# Patient Record
Sex: MALE | Race: OTHER | HISPANIC OR LATINO
[De-identification: names, ages, dates, MRNs, and addresses within clinical notes are randomized per-mention and may not be internally consistent; named-entity substitution may affect disease eponyms.]

---

## 2019-05-09 ENCOUNTER — APPOINTMENT (OUTPATIENT)
Dept: SURGERY | Facility: CLINIC | Age: 54
End: 2019-05-09
Payer: COMMERCIAL

## 2019-05-09 VITALS — HEIGHT: 69 IN | BODY MASS INDEX: 33.33 KG/M2 | WEIGHT: 225 LBS

## 2019-05-09 VITALS — SYSTOLIC BLOOD PRESSURE: 115 MMHG | HEART RATE: 75 BPM | OXYGEN SATURATION: 96 % | DIASTOLIC BLOOD PRESSURE: 69 MMHG

## 2019-05-09 PROBLEM — Z00.00 ENCOUNTER FOR PREVENTIVE HEALTH EXAMINATION: Status: ACTIVE | Noted: 2019-05-09

## 2019-05-09 PROCEDURE — 99243 OFF/OP CNSLTJ NEW/EST LOW 30: CPT

## 2019-06-28 ENCOUNTER — OUTPATIENT (OUTPATIENT)
Dept: OUTPATIENT SERVICES | Facility: HOSPITAL | Age: 54
LOS: 1 days | End: 2019-06-28
Payer: COMMERCIAL

## 2019-06-28 VITALS
OXYGEN SATURATION: 99 % | RESPIRATION RATE: 18 BRPM | HEART RATE: 71 BPM | SYSTOLIC BLOOD PRESSURE: 139 MMHG | HEIGHT: 69 IN | TEMPERATURE: 98 F | DIASTOLIC BLOOD PRESSURE: 87 MMHG | WEIGHT: 225.09 LBS

## 2019-06-28 DIAGNOSIS — I10 ESSENTIAL (PRIMARY) HYPERTENSION: ICD-10-CM

## 2019-06-28 DIAGNOSIS — R73.03 PREDIABETES: ICD-10-CM

## 2019-06-28 DIAGNOSIS — Z98.89 OTHER SPECIFIED POSTPROCEDURAL STATES: Chronic | ICD-10-CM

## 2019-06-28 DIAGNOSIS — K40.90 UNILATERAL INGUINAL HERNIA, WITHOUT OBSTRUCTION OR GANGRENE, NOT SPECIFIED AS RECURRENT: ICD-10-CM

## 2019-06-28 DIAGNOSIS — Z01.818 ENCOUNTER FOR OTHER PREPROCEDURAL EXAMINATION: ICD-10-CM

## 2019-06-28 PROCEDURE — G0463: CPT

## 2019-06-28 RX ORDER — SODIUM CHLORIDE 9 MG/ML
3 INJECTION INTRAMUSCULAR; INTRAVENOUS; SUBCUTANEOUS EVERY 8 HOURS
Refills: 0 | Status: DISCONTINUED | OUTPATIENT
Start: 2019-07-01 | End: 2019-07-09

## 2019-06-28 NOTE — H&P PST ADULT - NSCAFFEAMTFREQ_GEN_ALL_CORE_SD
Patient called stating her doctor wants her to have a procedure done. She does not think she needs it and wants to know if it will affect her MS. She would not tell me what procedure.   Can be reached [phone] 1-2 cups/cans per day

## 2019-06-28 NOTE — H&P PST ADULT - NSICDXPASTSURGICALHX_GEN_ALL_CORE_FT
PAST SURGICAL HISTORY:  S/P wrist surgery left PAST SURGICAL HISTORY:  H/O excision of mass abdominal wall mass    History of fusion of cervical spine C5-C6 fracture    S/P wrist surgery left

## 2019-06-28 NOTE — H&P PST ADULT - NEGATIVE OPHTHALMOLOGIC SYMPTOMS
From: Colonel Benavides  To: Margarita Blanca NP  Sent: 6/28/2017 11:15 AM EDT  Subject: Prescription Question    Delta Cruz,   This is Darryl Mancuso wife. We took him to the [facility] on 6/22 for staph infection generalized all over his body. He was taking Cipro 500mg for this since 6/23 but somehow he lost his prescription. He had taken the Meds for 4 days out of the 10 with the start of some improvement in the skin. I am sending this to see if you could possibly send a RX to the pharmacy for the other 6 days. Please let us know if this would be ok or does he need to come see you for this.    Thank you  [facility]
no blurred vision R/no blurred vision L

## 2019-06-28 NOTE — H&P PST ADULT - RS GEN PE MLT RESP DETAILS PC
breath sounds equal/good air movement/no rhonchi/normal/airway patent/clear to auscultation bilaterally/no rales/no chest wall tenderness/no intercostal retractions/no subcutaneous emphysema/no wheezes/respirations non-labored

## 2019-06-28 NOTE — H&P PST ADULT - NEGATIVE GENERAL GENITOURINARY SYMPTOMS
BPH/no dysuria/no nocturia/normal urinary frequency/no incontinence/no hematuria/no urine discoloration/no bladder infections

## 2019-06-28 NOTE — H&P PST ADULT - ASSESSMENT
53 yr old male with PMH of HTN, prediabetes, BPH, Hyperuricemia presents with right inguinal hernia. Pt is scheduled for right inguinal hernia repair with mesh on 7/01/2019.

## 2019-06-28 NOTE — H&P PST ADULT - NSICDXFAMILYHX_GEN_ALL_CORE_FT
FAMILY HISTORY:  Family history of diabetes mellitus in father  Family history of diabetes mellitus in mother  Family history of hypertension in father  Family history of hypertension in mother  Family history of throat cancer, father

## 2019-06-28 NOTE — H&P PST ADULT - GASTROINTESTINAL DETAILS
nontender/soft/no distention/no rebound tenderness/bowel sounds normal/no bruit/normal/no masses palpable

## 2019-06-28 NOTE — H&P PST ADULT - NSICDXPASTMEDICALHX_GEN_ALL_CORE_FT
PAST MEDICAL HISTORY:  BPH (benign prostatic hyperplasia)     HTN (hypertension)     Kidney stone     Prediabetes

## 2019-06-28 NOTE — H&P PST ADULT - NSICDXPROBLEM_GEN_ALL_CORE_FT
PROBLEM DIAGNOSES  Problem: Prediabetes  Assessment and Plan: Lifestyle modifications encouraged. Perioperative glucose monitoring and cover as needed.    Problem: HTN (hypertension)  Assessment and Plan: Instructed to take Amlodipine and take with sips of water on day of surgery. Cleared by PCP for procedure. Follow-up with PCP for blood pressure management.    Problem: Right inguinal hernia  Assessment and Plan: Repair of right inguinal hernia with mesh on 7/01/2019. Preoperative instructions given. Verbalized understanding.

## 2019-06-28 NOTE — H&P PST ADULT - HISTORY OF PRESENT ILLNESS
53 yr old male with PMH of presents with c/o intermittent inguinal pain due to hernia. Pt reports worsening of pain with activity. Pt for right inguinal hernia repair with mesh on 7/01/2019. 53 yr old male with PMH of HTN, prediabetes, BPH, Hyperuricemia presents with c/o right intermittent inguinal pain due to hernia. Pt reports worsening of pain with strenuous activities and palpation. Pt for right inguinal hernia repair with mesh on 7/01/2019.

## 2019-06-28 NOTE — H&P PST ADULT - NEGATIVE CARDIOVASCULAR SYMPTOMS
no dyspnea on exertion/no chest pain/no orthopnea/no paroxysmal nocturnal dyspnea/no peripheral edema/no claudication/no palpitations

## 2019-06-28 NOTE — H&P PST ADULT - NSANTHOSAYNRD_GEN_A_CORE
No. ZAIRA screening performed.  STOP BANG Legend: 0-2 = LOW Risk; 3-4 = INTERMEDIATE Risk; 5-8 = HIGH Risk

## 2019-06-30 ENCOUNTER — TRANSCRIPTION ENCOUNTER (OUTPATIENT)
Age: 54
End: 2019-06-30

## 2019-07-01 ENCOUNTER — OUTPATIENT (OUTPATIENT)
Dept: OUTPATIENT SERVICES | Facility: HOSPITAL | Age: 54
LOS: 1 days | End: 2019-07-01
Payer: COMMERCIAL

## 2019-07-01 ENCOUNTER — APPOINTMENT (OUTPATIENT)
Dept: SURGERY | Facility: HOSPITAL | Age: 54
End: 2019-07-01

## 2019-07-01 VITALS
HEIGHT: 69 IN | DIASTOLIC BLOOD PRESSURE: 86 MMHG | OXYGEN SATURATION: 97 % | WEIGHT: 225.09 LBS | TEMPERATURE: 98 F | RESPIRATION RATE: 16 BRPM | HEART RATE: 65 BPM | SYSTOLIC BLOOD PRESSURE: 140 MMHG

## 2019-07-01 VITALS
TEMPERATURE: 98 F | SYSTOLIC BLOOD PRESSURE: 110 MMHG | HEART RATE: 66 BPM | DIASTOLIC BLOOD PRESSURE: 76 MMHG | OXYGEN SATURATION: 100 % | RESPIRATION RATE: 17 BRPM

## 2019-07-01 DIAGNOSIS — Z01.818 ENCOUNTER FOR OTHER PREPROCEDURAL EXAMINATION: ICD-10-CM

## 2019-07-01 DIAGNOSIS — Z98.890 OTHER SPECIFIED POSTPROCEDURAL STATES: Chronic | ICD-10-CM

## 2019-07-01 DIAGNOSIS — K40.90 UNILATERAL INGUINAL HERNIA, WITHOUT OBSTRUCTION OR GANGRENE, NOT SPECIFIED AS RECURRENT: ICD-10-CM

## 2019-07-01 DIAGNOSIS — Z98.1 ARTHRODESIS STATUS: Chronic | ICD-10-CM

## 2019-07-01 DIAGNOSIS — Z98.89 OTHER SPECIFIED POSTPROCEDURAL STATES: Chronic | ICD-10-CM

## 2019-07-01 LAB — GLUCOSE BLDC GLUCOMTR-MCNC: 117 MG/DL — HIGH (ref 70–99)

## 2019-07-01 PROCEDURE — 82962 GLUCOSE BLOOD TEST: CPT

## 2019-07-01 PROCEDURE — 49505 PRP I/HERN INIT REDUC >5 YR: CPT | Mod: RT

## 2019-07-01 PROCEDURE — C1781: CPT

## 2019-07-01 PROCEDURE — 49505 PRP I/HERN INIT REDUC >5 YR: CPT | Mod: AS,RT

## 2019-07-01 RX ORDER — SODIUM CHLORIDE 9 MG/ML
1000 INJECTION, SOLUTION INTRAVENOUS
Refills: 0 | Status: DISCONTINUED | OUTPATIENT
Start: 2019-07-01 | End: 2019-07-01

## 2019-07-01 RX ORDER — ONDANSETRON 8 MG/1
4 TABLET, FILM COATED ORAL ONCE
Refills: 0 | Status: DISCONTINUED | OUTPATIENT
Start: 2019-07-01 | End: 2019-07-01

## 2019-07-01 RX ORDER — ACETAMINOPHEN WITH CODEINE 300MG-30MG
1 TABLET ORAL
Qty: 8 | Refills: 0
Start: 2019-07-01

## 2019-07-01 NOTE — ASU DISCHARGE PLAN (ADULT/PEDIATRIC) - ASU DC SPECIAL INSTRUCTIONSFT
Drink plenty of fluids. Rest as needed. Do not lift anything heavier than 10 pounds. You may take motrin or advil for mild pain as needed, in addition to prescribed narcotic medication. Call for any fever over 101, nausea, vomiting, severe pain, no passing of gas or bowel movement. You may shower 48 hours postoperatively. Remove outer dressing. Keep white steri-strips in place and allow them to fall off on their own. Pat dry.

## 2019-07-01 NOTE — ASU DISCHARGE PLAN (ADULT/PEDIATRIC) - CALL YOUR DOCTOR IF YOU HAVE ANY OF THE FOLLOWING:
Wound/Surgical Site with redness, or foul smelling discharge or pus/Nausea and vomiting that does not stop/Swelling that gets worse/Numbness, tingling, color or temperature change to extremity/Pain not relieved by Medications/Bleeding that does not stop

## 2019-07-01 NOTE — ASU DISCHARGE PLAN (ADULT/PEDIATRIC) - CARE PROVIDER_API CALL
Jeff Pablo)  Surgery  [address] [facility], [place] [address]  Phone: [phone]  Fax: [phone]  Follow Up Time:

## 2019-07-03 PROBLEM — I10 ESSENTIAL (PRIMARY) HYPERTENSION: Chronic | Status: ACTIVE | Noted: 2019-06-28

## 2019-07-03 PROBLEM — R73.03 PREDIABETES: Chronic | Status: ACTIVE | Noted: 2019-06-28

## 2019-07-03 PROBLEM — N40.0 BENIGN PROSTATIC HYPERPLASIA WITHOUT LOWER URINARY TRACT SYMPTOMS: Chronic | Status: ACTIVE | Noted: 2019-06-28

## 2019-07-08 PROBLEM — Z86.79 HISTORY OF ESSENTIAL HYPERTENSION: Status: RESOLVED | Noted: 2019-07-08 | Resolved: 2019-07-08

## 2019-07-08 PROBLEM — Z83.3 FAMILY HISTORY OF TYPE 2 DIABETES MELLITUS: Status: ACTIVE | Noted: 2019-07-08

## 2019-07-08 PROBLEM — Z87.442 HISTORY OF KIDNEY STONES: Status: RESOLVED | Noted: 2019-07-08 | Resolved: 2019-07-08

## 2019-07-08 PROBLEM — K40.90 INGUINAL HERNIA, RIGHT: Status: ACTIVE | Noted: 2019-07-08

## 2019-07-08 PROBLEM — Z87.898 FORMER CONSUMPTION OF ALCOHOL: Status: ACTIVE | Noted: 2019-07-08

## 2019-07-08 PROBLEM — Z82.49 FAMILY HISTORY OF ESSENTIAL HYPERTENSION: Status: ACTIVE | Noted: 2019-07-08

## 2019-07-08 PROBLEM — Z87.898 HISTORY OF PREDIABETES: Status: RESOLVED | Noted: 2019-07-08 | Resolved: 2019-07-08

## 2019-07-08 PROBLEM — Z87.438 HISTORY OF BENIGN PROSTATIC HYPERPLASIA: Status: RESOLVED | Noted: 2019-07-08 | Resolved: 2019-07-08

## 2019-07-08 PROBLEM — Z87.891 FORMER SMOKER: Status: ACTIVE | Noted: 2019-07-08

## 2019-07-08 PROBLEM — Z80.0 FAMILY HISTORY OF THROAT CANCER: Status: ACTIVE | Noted: 2019-07-08

## 2019-07-15 ENCOUNTER — APPOINTMENT (OUTPATIENT)
Dept: SURGERY | Facility: CLINIC | Age: 54
End: 2019-07-15
Payer: COMMERCIAL

## 2019-07-15 VITALS
TEMPERATURE: 98 F | WEIGHT: 226 LBS | HEART RATE: 66 BPM | HEIGHT: 69 IN | DIASTOLIC BLOOD PRESSURE: 78 MMHG | SYSTOLIC BLOOD PRESSURE: 117 MMHG | BODY MASS INDEX: 33.47 KG/M2

## 2019-07-15 DIAGNOSIS — G89.18 OTHER ACUTE POSTPROCEDURAL PAIN: ICD-10-CM

## 2019-07-15 DIAGNOSIS — Z87.442 PERSONAL HISTORY OF URINARY CALCULI: ICD-10-CM

## 2019-07-15 DIAGNOSIS — Z87.898 PERSONAL HISTORY OF OTHER SPECIFIED CONDITIONS: ICD-10-CM

## 2019-07-15 DIAGNOSIS — Z82.49 FAMILY HISTORY OF ISCHEMIC HEART DISEASE AND OTHER DISEASES OF THE CIRCULATORY SYSTEM: ICD-10-CM

## 2019-07-15 DIAGNOSIS — Z80.0 FAMILY HISTORY OF MALIGNANT NEOPLASM OF DIGESTIVE ORGANS: ICD-10-CM

## 2019-07-15 DIAGNOSIS — Z87.891 PERSONAL HISTORY OF NICOTINE DEPENDENCE: ICD-10-CM

## 2019-07-15 DIAGNOSIS — Z86.79 PERSONAL HISTORY OF OTHER DISEASES OF THE CIRCULATORY SYSTEM: ICD-10-CM

## 2019-07-15 DIAGNOSIS — K40.90 UNILATERAL INGUINAL HERNIA, W/OUT OBSTRUCTION OR GANGRENE, NOT SPECIFIED AS RECURRENT: ICD-10-CM

## 2019-07-15 DIAGNOSIS — Z87.438 PERSONAL HISTORY OF OTHER DISEASES OF MALE GENITAL ORGANS: ICD-10-CM

## 2019-07-15 DIAGNOSIS — Z83.3 FAMILY HISTORY OF DIABETES MELLITUS: ICD-10-CM

## 2019-07-15 PROCEDURE — 99024 POSTOP FOLLOW-UP VISIT: CPT

## 2019-07-15 RX ORDER — IBUPROFEN 600 MG/1
600 TABLET, FILM COATED ORAL EVERY 6 HOURS
Qty: 56 | Refills: 0 | Status: ACTIVE | COMMUNITY
Start: 2019-07-15 | End: 1900-01-01

## 2019-07-15 NOTE — HISTORY OF PRESENT ILLNESS
[de-identified] : Patient is s/p right inguinal hernia repair with mesh on 7/01/2019. Today patient offers no complaints. patient reports no fever or chills. patient reports episodes of   pain in the right groin and states that it has been getting better. his surgical wound is healing well. No signs of inflammation, infection or exudate. no recurrence. Patient reports good bowel movements and appetite. \par  \par

## 2019-07-15 NOTE — PHYSICAL EXAM
[de-identified] : The patient is alert, well-groomed, and cheerful. [de-identified] : right groin Surgical wound is healing well.   no signs of  inflammation or infection. no recurrence

## 2019-07-15 NOTE — VITALS
[de-identified] : 4/10 [FreeTextEntry3] : right groin  [FreeTextEntry1] : ibu  [FreeTextEntry2] : movement

## 2019-07-15 NOTE — PLAN
[FreeTextEntry1] : patient will follow up in 3 months or if needed. Warning signs, follow up, and restrictions were discussed with the patient. \par no heavy lifting  4-6 weeks.\par IBU for pain PRN

## 2020-10-01 ENCOUNTER — APPOINTMENT (OUTPATIENT)
Dept: SURGERY | Facility: CLINIC | Age: 55
End: 2020-10-01
Payer: COMMERCIAL

## 2020-10-01 VITALS
HEIGHT: 69 IN | OXYGEN SATURATION: 97 % | SYSTOLIC BLOOD PRESSURE: 119 MMHG | WEIGHT: 218 LBS | TEMPERATURE: 96.2 F | HEART RATE: 57 BPM | BODY MASS INDEX: 32.29 KG/M2 | DIASTOLIC BLOOD PRESSURE: 82 MMHG

## 2020-10-01 PROCEDURE — 99243 OFF/OP CNSLTJ NEW/EST LOW 30: CPT

## 2020-10-01 RX ORDER — OMEPRAZOLE 40 MG/1
40 CAPSULE, DELAYED RELEASE ORAL
Refills: 0 | Status: ACTIVE | COMMUNITY

## 2020-10-01 RX ORDER — AMLODIPINE BESYLATE 5 MG/1
TABLET ORAL
Refills: 0 | Status: ACTIVE | COMMUNITY

## 2020-10-01 NOTE — PLAN
[FreeTextEntry1] : Mr. VANEGAS  was told significance of findings, options, risks and benefits were explained.  Informed consent for excision left flank mass and potential risks, benefits and alternatives (surgical options were discussed including non-surgical options or the option of no surgery) to the planned surgery were discussed in depth.  All surgical options were discussed including non-surgical treatments.  He wishes to proceed with surgery.  We will plan for surgery on at the next available date, pending any required insurance pre-certification or pre-approval. He agrees to obtain any necessary pre-operative evaluations and testing prior to surgery.\par Patient advised to seek immediate medical attention with any acute change in symptoms or with the development of any new or worsening symptoms.  Patient's questions and concerns addressed to patient's satisfaction, and patient verbalized an understanding of the information discussed.\par \par

## 2020-10-01 NOTE — HISTORY OF PRESENT ILLNESS
[de-identified] : This is a 55 year  old patient who was referred by Dr. Ruben Salinas with the chief complaint of having left flank mass.  He reports having this condition for 5 months.  He denies any trauma to the area.   He denies any fever or  night sweats. Appetite is good and weight is stable.  He states that the mass is getting bigger and  more symptomatic. He wants to know if it could  be surgically  removed.\par \par  [de-identified] : Patient reports no interval changes to her overall health status or medical history

## 2020-10-01 NOTE — PHYSICAL EXAM
[Abdominal Masses] : No abdominal masses [Abdomen Tenderness] : ~T ~M No abdominal tenderness [Alert] : alert [Oriented to Person] : oriented to person [Oriented to Place] : oriented to place [Oriented to Time] : oriented to time [Calm] : calm [de-identified] : He  is alert, well-groomed, and cheerful.\par   [de-identified] : anicteric.  Nasal mucosa pink, septum midline. Oral mucosa pink.  Tongue midline, Pharynx without exudates.\par   [de-identified] : Neck supple. Trachea midline. Thyroid isthmus barely palpable, lobes not felt.\par   [de-identified] : left flank mass  is mobile, Firm, Smooth, non-tender,   Well defined. Superficial. No palpable lymph nodes.   Mass size - 3 cm x 3  cm.

## 2020-10-01 NOTE — CONSULT LETTER
[Dear  ___] : Dear  [unfilled], [Consult Letter:] : I had the pleasure of evaluating your patient, [unfilled]. [Please see my note below.] : Please see my note below. [Consult Closing:] : Thank you very much for allowing me to participate in the care of this patient.  If you have any questions, please do not hesitate to contact me. [Sincerely,] : Sincerely, [FreeTextEntry3] : Jeff Pablo MD, FACS

## 2020-10-23 ENCOUNTER — OUTPATIENT (OUTPATIENT)
Dept: OUTPATIENT SERVICES | Facility: HOSPITAL | Age: 55
LOS: 1 days | End: 2020-10-23
Payer: COMMERCIAL

## 2020-10-23 VITALS
OXYGEN SATURATION: 99 % | WEIGHT: 218.04 LBS | TEMPERATURE: 97 F | HEIGHT: 69 IN | SYSTOLIC BLOOD PRESSURE: 130 MMHG | RESPIRATION RATE: 18 BRPM | HEART RATE: 68 BPM | DIASTOLIC BLOOD PRESSURE: 74 MMHG

## 2020-10-23 DIAGNOSIS — M79.89 OTHER SPECIFIED SOFT TISSUE DISORDERS: ICD-10-CM

## 2020-10-23 DIAGNOSIS — Z01.818 ENCOUNTER FOR OTHER PREPROCEDURAL EXAMINATION: ICD-10-CM

## 2020-10-23 DIAGNOSIS — I10 ESSENTIAL (PRIMARY) HYPERTENSION: ICD-10-CM

## 2020-10-23 DIAGNOSIS — Z98.890 OTHER SPECIFIED POSTPROCEDURAL STATES: Chronic | ICD-10-CM

## 2020-10-23 DIAGNOSIS — Z98.89 OTHER SPECIFIED POSTPROCEDURAL STATES: Chronic | ICD-10-CM

## 2020-10-23 DIAGNOSIS — Z98.1 ARTHRODESIS STATUS: Chronic | ICD-10-CM

## 2020-10-23 PROCEDURE — G0463: CPT

## 2020-10-23 RX ORDER — TAMSULOSIN HYDROCHLORIDE 0.4 MG/1
1 CAPSULE ORAL
Qty: 0 | Refills: 0 | DISCHARGE

## 2020-10-23 NOTE — H&P PST ADULT - NEGATIVE CARDIOVASCULAR SYMPTOMS
no palpitations/no chest pain/no peripheral edema/no orthopnea/no dyspnea on exertion/no claudication/no paroxysmal nocturnal dyspnea

## 2020-10-23 NOTE — H&P PST ADULT - GASTROINTESTINAL DETAILS
soft/normal/bowel sounds normal/no bruit/no distention/no masses palpable/no rebound tenderness/nontender

## 2020-10-23 NOTE — H&P PST ADULT - ASSESSMENT
53 yr old male with PMH of HTN, prediabetes, BPH, Hyperuricemia presents with right inguinal hernia. Pt is scheduled for right inguinal hernia repair with mesh on 7/01/2019. 55 yr old male with left flank mass.

## 2020-10-23 NOTE — H&P PST ADULT - NSICDXPASTMEDICALHX_GEN_ALL_CORE_FT
PAST MEDICAL HISTORY:  BPH (benign prostatic hyperplasia)     HTN (hypertension)     Kidney stone     Prediabetes PAST MEDICAL HISTORY:  BPH (benign prostatic hyperplasia)     HTN (hypertension)     Kidney stone     Prediabetes

## 2020-10-23 NOTE — H&P PST ADULT - NSICDXPASTSURGICALHX_GEN_ALL_CORE_FT
PAST SURGICAL HISTORY:  H/O excision of mass abdominal wall mass    History of fusion of cervical spine C5-C6 fracture    S/P wrist surgery left PAST SURGICAL HISTORY:  H/O excision of mass abdominal wall mass    H/O inguinal hernia repair 2018    History of fusion of cervical spine C5-C6 fracture    S/P wrist surgery left

## 2020-10-23 NOTE — H&P PST ADULT - RS GEN PE MLT RESP DETAILS PC
normal/breath sounds equal/respirations non-labored/airway patent/no subcutaneous emphysema/no wheezes/no rales/clear to auscultation bilaterally/no rhonchi/good air movement/no chest wall tenderness/no intercostal retractions

## 2020-10-23 NOTE — H&P PST ADULT - NSICDXFAMILYHX_GEN_ALL_CORE_FT
FAMILY HISTORY:  Family history of diabetes mellitus in father  Family history of diabetes mellitus in mother  Family history of hypertension in father  Family history of hypertension in mother  Family history of throat cancer, father FAMILY HISTORY:  Family history of diabetes mellitus in father  Family history of diabetes mellitus in mother  Family history of hypertension in father  Family history of hypertension in mother  Family history of throat cancer, father

## 2020-10-23 NOTE — H&P PST ADULT - HISTORY OF PRESENT ILLNESS
53 yr old male with PMH of HTN, prediabetes, BPH, Hyperuricemia presents with c/o right intermittent inguinal pain due to hernia. Pt reports worsening of pain with strenuous activities and palpation. Pt for right inguinal hernia repair with mesh on 7/01/2019. 55 yr old male with PMH of HTN, prediabetes, BPH, Hyperuricemia presents [facility] for pres[facility]. Patient has mass on the left side of the back and is now scheduled for excision of left flank mass on 10/30/20.

## 2020-10-23 NOTE — H&P PST ADULT - NEGATIVE GENERAL GENITOURINARY SYMPTOMS
no bladder infections/normal urinary frequency/no nocturia/no hematuria/no dysuria/BPH/no urine discoloration/no incontinence

## 2020-10-23 NOTE — H&P PST ADULT - NSICDXPROBLEM_GEN_ALL_CORE_FT
PROBLEM DIAGNOSES  Problem: Prediabetes  Assessment and Plan: Lifestyle modifications encouraged. Perioperative glucose monitoring and cover as needed.    Problem: HTN (hypertension)  Assessment and Plan: Instructed to take Amlodipine and take with sips of water on day of surgery. Cleared by PCP for procedure. Follow-up with PCP for blood pressure management.    Problem: Right inguinal hernia  Assessment and Plan: Repair of right inguinal hernia with mesh on 7/01/2019. Preoperative instructions given. Verbalized understanding. PROBLEM DIAGNOSES  Problem: Other specified soft tissue disorders  Assessment and Plan: Patient scheduled for excision of left flank mass on 10/30/20. Preop instructions given via  over the phone.     Problem: HTN (hypertension)  Assessment and Plan: Patient instructed to continye Amlodipine as prescibed by PCP.

## 2020-10-25 DIAGNOSIS — Z01.818 ENCOUNTER FOR OTHER PREPROCEDURAL EXAMINATION: ICD-10-CM

## 2020-10-27 ENCOUNTER — APPOINTMENT (OUTPATIENT)
Dept: DISASTER EMERGENCY | Facility: CLINIC | Age: 55
End: 2020-10-27

## 2020-10-29 ENCOUNTER — TRANSCRIPTION ENCOUNTER (OUTPATIENT)
Age: 55
End: 2020-10-29

## 2020-10-29 LAB — SARS-COV-2 N GENE NPH QL NAA+PROBE: NOT DETECTED

## 2020-10-30 ENCOUNTER — RESULT REVIEW (OUTPATIENT)
Age: 55
End: 2020-10-30

## 2020-10-30 ENCOUNTER — APPOINTMENT (OUTPATIENT)
Dept: SURGERY | Facility: HOSPITAL | Age: 55
End: 2020-10-30
Payer: COMMERCIAL

## 2020-10-30 ENCOUNTER — OUTPATIENT (OUTPATIENT)
Dept: OUTPATIENT SERVICES | Facility: HOSPITAL | Age: 55
LOS: 1 days | End: 2020-10-30
Payer: COMMERCIAL

## 2020-10-30 VITALS
RESPIRATION RATE: 17 BRPM | HEIGHT: 69 IN | TEMPERATURE: 97 F | DIASTOLIC BLOOD PRESSURE: 75 MMHG | WEIGHT: 218.04 LBS | OXYGEN SATURATION: 100 % | SYSTOLIC BLOOD PRESSURE: 127 MMHG | HEART RATE: 72 BPM

## 2020-10-30 VITALS
OXYGEN SATURATION: 99 % | HEART RATE: 65 BPM | TEMPERATURE: 98 F | SYSTOLIC BLOOD PRESSURE: 128 MMHG | RESPIRATION RATE: 16 BRPM | DIASTOLIC BLOOD PRESSURE: 80 MMHG

## 2020-10-30 DIAGNOSIS — Z98.89 OTHER SPECIFIED POSTPROCEDURAL STATES: Chronic | ICD-10-CM

## 2020-10-30 DIAGNOSIS — Z98.890 OTHER SPECIFIED POSTPROCEDURAL STATES: Chronic | ICD-10-CM

## 2020-10-30 DIAGNOSIS — Z98.1 ARTHRODESIS STATUS: Chronic | ICD-10-CM

## 2020-10-30 DIAGNOSIS — M79.89 OTHER SPECIFIED SOFT TISSUE DISORDERS: ICD-10-CM

## 2020-10-30 LAB — GLUCOSE BLDC GLUCOMTR-MCNC: 99 MG/DL — SIGNIFICANT CHANGE UP (ref 70–99)

## 2020-10-30 PROCEDURE — 82962 GLUCOSE BLOOD TEST: CPT

## 2020-10-30 PROCEDURE — 88305 TISSUE EXAM BY PATHOLOGIST: CPT | Mod: 26

## 2020-10-30 PROCEDURE — 21931 EXC BACK LES SC 3 CM/>: CPT | Mod: LT

## 2020-10-30 PROCEDURE — 21931 EXC BACK LES SC 3 CM/>: CPT | Mod: AS

## 2020-10-30 PROCEDURE — 11404 EXC TR-EXT B9+MARG 3.1-4 CM: CPT

## 2020-10-30 PROCEDURE — 12032 INTMD RPR S/A/T/EXT 2.6-7.5: CPT

## 2020-10-30 PROCEDURE — 88305 TISSUE EXAM BY PATHOLOGIST: CPT

## 2020-10-30 RX ORDER — OMEPRAZOLE 10 MG/1
1 CAPSULE, DELAYED RELEASE ORAL
Qty: 0 | Refills: 0 | DISCHARGE

## 2020-10-30 RX ORDER — SODIUM CHLORIDE 9 MG/ML
3 INJECTION INTRAMUSCULAR; INTRAVENOUS; SUBCUTANEOUS EVERY 8 HOURS
Refills: 0 | Status: DISCONTINUED | OUTPATIENT
Start: 2020-10-30 | End: 2020-10-30

## 2020-10-30 RX ORDER — ONDANSETRON 8 MG/1
4 TABLET, FILM COATED ORAL EVERY 6 HOURS
Refills: 0 | Status: DISCONTINUED | OUTPATIENT
Start: 2020-10-30 | End: 2020-11-06

## 2020-10-30 RX ORDER — OXYCODONE AND ACETAMINOPHEN 5; 325 MG/1; MG/1
1 TABLET ORAL EVERY 6 HOURS
Refills: 0 | Status: DISCONTINUED | OUTPATIENT
Start: 2020-10-30 | End: 2020-10-30

## 2020-10-30 RX ORDER — AMLODIPINE BESYLATE 2.5 MG/1
1 TABLET ORAL
Qty: 0 | Refills: 0 | DISCHARGE

## 2020-10-30 RX ORDER — TRAMADOL HYDROCHLORIDE 50 MG/1
1 TABLET ORAL
Qty: 16 | Refills: 0
Start: 2020-10-30 | End: 2020-11-02

## 2020-10-30 RX ORDER — SODIUM CHLORIDE 9 MG/ML
1000 INJECTION, SOLUTION INTRAVENOUS
Refills: 0 | Status: DISCONTINUED | OUTPATIENT
Start: 2020-10-30 | End: 2020-11-02

## 2020-10-30 RX ORDER — ACETAMINOPHEN 500 MG
1000 TABLET ORAL ONCE
Refills: 0 | Status: DISCONTINUED | OUTPATIENT
Start: 2020-10-30 | End: 2020-11-02

## 2020-10-30 NOTE — ASU PATIENT PROFILE, ADULT - MEDICATION ADMINISTRATION INFO, PROFILE
Notified by Micro that pt BC are gram positive cocci resembling staph. IMJ on call MD paged. Waiting for call back.   no concerns

## 2020-10-30 NOTE — ASU DISCHARGE PLAN (ADULT/PEDIATRIC) - CARE PROVIDER_API CALL
Jeff Pablo  SURGERY  [address] [place] [address]  Phone: [phone]  Fax: [phone]  Established Patient  Follow Up Time: 2 weeks

## 2020-10-30 NOTE — ASU DISCHARGE PLAN (ADULT/PEDIATRIC) - CALL YOUR DOCTOR IF YOU HAVE ANY OF THE FOLLOWING:
Fever greater than (need to indicate Fahrenheit or Celsius)/Bleeding that does not stop/Swelling that gets worse/Pain not relieved by Medications/Wound/Surgical Site with redness, or foul smelling discharge or pus

## 2020-11-03 LAB — SURGICAL PATHOLOGY STUDY: SIGNIFICANT CHANGE UP

## 2020-11-10 PROBLEM — M79.89 SOFT TISSUE MASS: Status: ACTIVE | Noted: 2020-10-01

## 2020-11-16 ENCOUNTER — APPOINTMENT (OUTPATIENT)
Dept: SURGERY | Facility: CLINIC | Age: 55
End: 2020-11-16
Payer: COMMERCIAL

## 2020-11-16 DIAGNOSIS — M79.89 OTHER SPECIFIED SOFT TISSUE DISORDERS: ICD-10-CM

## 2020-11-16 PROCEDURE — 99024 POSTOP FOLLOW-UP VISIT: CPT

## 2020-11-16 NOTE — PHYSICAL EXAM
[de-identified] : He  is alert, well-groomed, and cheerful.\par   [de-identified] : left flank Surgical wound is healing well.   no signs of  inflammation or infection.

## 2020-11-16 NOTE — HISTORY OF PRESENT ILLNESS
[de-identified] : Mr. VANEGAS  is s/p Excision of left flank mass on 10/30/2020. Patient's pathology results were  consistent with lipoma. Today  Mr. VANEGAS offers no complaints. patient reports no fever, chills,  or  pain.  His surgical wound is healing well. No signs of inflammation, infection or exudate. \par

## 2020-11-16 NOTE — ASSESSMENT
[FreeTextEntry1] : Mr. VANEGAS is doing well, with excellent post-operative recovery. The surgical incision is healing well and as expected. There is no evidence of infection or complication, and patient is progressing as expected. Post-operative wound care, activity, restrictions and precautions reinforced.  Pathology results were discussed in details. Patient's questions and concerns addressed to patient's satisfaction.\par

## 2020-11-16 NOTE — DATA REVIEWED
[FreeTextEntry1] :  Rogelio Accession Number : [id number]\par \par JURGEN VANEGAS                        2\par \par \par \par Surgical Final Report\par \par \par \par \par Final Diagnosis\par \par Mass, left flank; excision:\par - Mature adipose tissue consistent with lipoma.\par \par Verified by: Pallavi Garcia MD\par (Electronic Signature)\par Reported on: 11/03/20 13:39 EST, [facility],\par [address] [facility], [place] [address]\par Phone: [phone]   Fax: [phone]\par _________________________________________________________________\par \par Clinical History\par Excision of left flank mass\par \par

## 2020-11-16 NOTE — PLAN
[FreeTextEntry1] : Mr. VANEGAS will follow up  if needed. Warning signs, follow up, and restrictions were discussed with the patient.

## 2021-01-05 ENCOUNTER — EMERGENCY (EMERGENCY)
Facility: HOSPITAL | Age: 56
LOS: 1 days | Discharge: HOME | End: 2021-01-05
Attending: EMERGENCY MEDICINE
Payer: COMMERCIAL

## 2021-01-05 VITALS
RESPIRATION RATE: 16 BRPM | TEMPERATURE: 98 F | OXYGEN SATURATION: 99 % | DIASTOLIC BLOOD PRESSURE: 70 MMHG | HEART RATE: 70 BPM | SYSTOLIC BLOOD PRESSURE: 130 MMHG

## 2021-01-05 VITALS
SYSTOLIC BLOOD PRESSURE: 135 MMHG | DIASTOLIC BLOOD PRESSURE: 83 MMHG | HEIGHT: 69 IN | OXYGEN SATURATION: 99 % | HEART RATE: 62 BPM | TEMPERATURE: 98 F | RESPIRATION RATE: 18 BRPM

## 2021-01-05 DIAGNOSIS — Z98.89 OTHER SPECIFIED POSTPROCEDURAL STATES: Chronic | ICD-10-CM

## 2021-01-05 DIAGNOSIS — Z98.1 ARTHRODESIS STATUS: Chronic | ICD-10-CM

## 2021-01-05 DIAGNOSIS — Z98.890 OTHER SPECIFIED POSTPROCEDURAL STATES: Chronic | ICD-10-CM

## 2021-01-05 PROCEDURE — 96365 THER/PROPH/DIAG IV INF INIT: CPT

## 2021-01-05 PROCEDURE — 99284 EMERGENCY DEPT VISIT MOD MDM: CPT | Mod: 25

## 2021-01-05 PROCEDURE — 99284 EMERGENCY DEPT VISIT MOD MDM: CPT

## 2021-01-05 PROCEDURE — 96375 TX/PRO/DX INJ NEW DRUG ADDON: CPT

## 2021-01-05 RX ORDER — SODIUM CHLORIDE 9 MG/ML
1000 INJECTION INTRAMUSCULAR; INTRAVENOUS; SUBCUTANEOUS ONCE
Refills: 0 | Status: COMPLETED | OUTPATIENT
Start: 2021-01-05 | End: 2021-01-05

## 2021-01-05 RX ORDER — KETOROLAC TROMETHAMINE 30 MG/ML
15 SYRINGE (ML) INJECTION ONCE
Refills: 0 | Status: DISCONTINUED | OUTPATIENT
Start: 2021-01-05 | End: 2021-01-05

## 2021-01-05 RX ORDER — METOCLOPRAMIDE HCL 10 MG
10 TABLET ORAL ONCE
Refills: 0 | Status: COMPLETED | OUTPATIENT
Start: 2021-01-05 | End: 2021-01-05

## 2021-01-05 RX ADMIN — SODIUM CHLORIDE 1000 MILLILITER(S): 9 INJECTION INTRAMUSCULAR; INTRAVENOUS; SUBCUTANEOUS at 12:27

## 2021-01-05 RX ADMIN — Medication 15 MILLIGRAM(S): at 12:10

## 2021-01-05 RX ADMIN — Medication 104 MILLIGRAM(S): at 11:40

## 2021-01-05 RX ADMIN — Medication 10 MILLIGRAM(S): at 12:10

## 2021-01-05 RX ADMIN — SODIUM CHLORIDE 1000 MILLILITER(S): 9 INJECTION INTRAMUSCULAR; INTRAVENOUS; SUBCUTANEOUS at 11:40

## 2021-01-05 RX ADMIN — Medication 15 MILLIGRAM(S): at 11:40

## 2021-01-05 NOTE — ED PROVIDER NOTE - CLINICAL SUMMARY MEDICAL DECISION MAKING FREE TEXT BOX
54 yo M with left and frontal headache. Noncompliant with bp meds. Neurologically intact without focal deficits. Analgesia given today. Encouraged to take bp meds daily. Nontoxic and medically stable for discharge. Return precautions provided and patient understands to return to the ED for worsening signs and symptoms. Instructed to follow up with primary care physician and agreeable. Patient's questions answered.     discharge and follow-up given via : Danny: [id number]

## 2021-01-05 NOTE — ED PROVIDER NOTE - CHPI ED SYMPTOMS NEG
no fever, no nausea, no emesis, no chest pain, no SOB, no abd pain, no dysuria, no hematuria, no diarrhea, no constipation

## 2021-01-05 NOTE — ED PROVIDER NOTE - NSFOLLOWUPCLINICS_GEN_ALL_ED_FT
[facility]  [facility]  [address] [facility] - [address] [place] [address]  Phone: [phone]  Fax: [phone]  Follow Up Time:

## 2021-01-05 NOTE — ED PROVIDER NOTE - NSFOLLOWUPINSTRUCTIONS_ED_ALL_ED_FT
Please return to the Emergency Department for worsening signs or symptoms. Please follow up with your primary care doctor.     Regrese al Departamento de Emergencias si los signos o síntomas empeoran. Mitchel un seguimiento con esteban médico de atención primaria.    Dolor de vinay regular    LO QUE NECESITA SABER:    El dolor de vinay puede ser leve o intenso. Las causas comunes incluyen el estrés, medicamentos y lesiones en la vinay. Problemas de sueño, alergias y cambios hormonales también pueden causar lore de vinay. Puede que usted sufra de lore de vinay frecuentes sin que se conozca esteban causa. Es probable que el dolor empiece en otra parte de esteban cuerpo y pase a esteban vinay. El dolor de vinay también puede moverse hacia otras partes de esteban cuerpo. Un dolor de vinay puede causar otros síntomas, catalina náusea y vómito. Un dolor de vinay shaila puede incluso ser pk señal de derrame cerebral u otro problema bossman que necesita de tratamiento inmediato.    INSTRUCCIONES SOBRE EL [facility]ITAL[facility]:    Llame al 911 en bebo de presentar lo siguiente:  •Usted tiene alguno de los siguientes signos de derrame cerebral: ?Adormecimiento o caída de un lado de esteban keanu      ?Debilidad en un brazo o pk pierna      ?Confusión o debilidad para hablar      ?Mareos o dolor de vinay intenso, o pérdida de la visión.          Regrese a la terrance de emergencias si:  •Usted tiene un dolor de vinay con rigidez de su y fiebre.      •Usted tiene un dolor de vinay ellen y está vomitando.      •Usted tiene dolor severo que no se dalton después de ronaldo viktoria medicamentos para el dolor.      •Usted tiene dolor de vinay y el dolor empeora cuando usted jazlyn hacia la terence.      •Usted tiene dolor de vinay y cambios en la vista, catalina visión borrosa.      •Usted tiene dolor de vinay y está olvidadizo o confundido.      Comuníquese con esteban médico si:  •Usted tiene dolor de vinay todos los días y no se dalton aun después de tratarlo.      •Viktoria lore de vinay cambian u ocurren nuevos síntomas cuando tiene dolor de vinay.      •Otras personas con las que usted vive o trabaja también tienen lore de vinay.      •Usted tiene preguntas o inquietudes acerca de esteban condición o cuidado.      Medicamentos:Es posible que usted necesite alguno de los siguientes:   •Los medicamentosPueden darse medicamentospara prevenir o tratar el dolor de vinay. No espere hasta que el dolor sea severo para ronaldo esteban medicamento. Pregunte al médico cómo debe ronaldo sara medicamento de forma ronquillo.      •Los DIANNA,catalina el ibuprofeno, ayudan a disminuir la inflamación, el dolor y la fiebre. Sara medicamento está disponible con o sin pk receta médica. Los DIANNA pueden causar sangrado estomacal o problemas renales en ciertas personas. Si usted esta tomando un anticoágulante,  siempre pregunte si los AINEs son seguros para usted. Siempre liz la etiqueta de sara medicamento y siga las instrucciones. No administre sara medicamento a niños menores de 6 meses de joe sin antes obtener la autorización de esteban médico.      •Acetaminofénalivia el dolor y baja la fiebre. Está disponible sin receta médica. Pregunte la cantidad y la frecuencia con que debe tomarlos. Siga las indicaciones. Liz las etiquetas de todos los demás medicamentos que esté usando para saber si también contienen acetaminofén, o pregunte a esteban médico o farmacéutico. El acetaminofén puede causar daño en el hígado cuando no se jung de forma correcta. No use más de 4 gramos (4000 miligramos) en total de acetaminofeno en un día.      •Los medicamentos contra las náuseaspueden darse para calmar esteban estómago y ayudar a prevenir los vómitos.      •[place] viktoria medicamentos catalina se le haya indicado.Consulte con esteban médico si usted gabriela que esteban medicamento no le está ayudando o si presenta efectos secundarios. Infórmele si es alérgico a algún medicamento. Mantenga pk lista actualizada de los medicamentos, las vitaminas y los productos herbales que jung. Incluya los siguientes datos de los medicamentos: cantidad, frecuencia y motivo de administración. Traiga con usted la lista o los envases de las píldoras a viktoria citas de seguimiento. Lleve la lista de los medicamentos con usted en bebo de pk emergencia.      El manejo de viktoria síntomas:  •Descanse en pk habitación oscura y tranquila.[place] catalina consecuencia podría ayudar a disminuir esteban dolor.      •Aplique calor o hielo según lo indicado.El calor o el hielo pueden ayudar a disminuir el dolor o los espasmos musculares. Aplíquese calor o hielo en el área por 20 minutos cada 2 horas por tantos días catalina se lo recomienden. Es probable que esteban médico le recomiende que alterne entre calor y hielo.      •Relaje viktoria músculos para ayudar a aliviar esteban dolor de vinay.Acuéstese en pk posición cómoda y cierre viktoria ojos. Relaje viktoria músculos lentamente. Comience por los dedos de los pies y avance hacia arriba al mark de esteban cuerpo. Un masaje o baño en agua tibia también pueden ayudar a relajar viktoria músculos.      Mantenga un registro de viktoria lore de vinay:Escriba en el diario las fechas y las horas en que usted sufre un dolor de vinay y lo que estaba haciendo antes de que empezara. Registre también lo que comió y bebió che las 24 horas previas a que comenzara el dolor de vinay. [place] puede ayudar a esteban médico a encontrar la causa de viktoria lore de vinay y así poder crear un plan de tratamiento. Viktoria anotaciones también pueden ayudarle a evitar lo que provoca viktoria lore de vinay o manejar viktoria síntomas.    Duerma suficiente:Usted debería dormir entre 8 y 10 horas cada noche. Establezca un horario para dormir. Acuéstese y levántese a la misma hora todos los días. Puede resultarle útil hacer algo relajante antes de acostarse. No tai televisión antes de acostarse.    No fume:La nicotina y otras sustancias químicas en los cigarrillos y cigarros pueden provocar un dolor de vinay tensional o empeorarlo. Pida información a esteban médico si usted actualmente fuma y necesita ayuda para dejar de fumar. Los cigarrillos electrónicos o el tabaco sin humo igualmente contienen nicotina. Consulte con esteban médico antes de utilizar estos productos.    [place] líquidos según viktoria indicaciones:Es probable que usted necesite ronaldo más líquido para prevenir la deshidratación. La deshidratación puede causar lore de vinay. Pregunte a esteban médico sobre la cantidad de líquido que necesita ronaldo todos los alisson y cuáles le recomienda.    Limite la cafeína y las bebidas alcohólicas según se le haya indicado:Viktoria lore de vinay pueden ser causados por la cafeína o el alcohol. Es probable que usted también desarrolle un dolor de vinay si jung cafeína regularmente y calvin de tomarla repentinamente.    Consuma alimentos saludables y variados:No se salte ninguna comida. [place] demasiado poco puede causar un dolor de vinay. Incluya frutas, vegetales, panes integrales, productos lácteos bajos en grasas, frijoles, vipin magras y pescado. No coma alimentos que provoquen viktoria lore de vinay, catalina chocolate y vino tinto. Alimentos que contienen gluten, nitratos, monosodio glutamato (MSG) o azúcares artificiales también pueden llegar a causar un dolor de vinay.    Acuda a viktoria consultas de control con esteban médico según le indicaron.Anote viktoria preguntas para que se acuerde de hacerlas che viktoria visitas.

## 2021-01-05 NOTE — ED PROVIDER NOTE - OBJECTIVE STATEMENT
Manuelito #[id number]. 56 y/o M pt with a PMHx of HTN and GERD presents to ED c/o frontal L sided headache since yesterday. Pt took his blood pressure yesterday and measured it to be 175 systole. Pt reportedly is supposed to take blood pressure medications but hasn't taken them because he does not feel his blood pressure is high. Pt awoke this morning and still had a headache so he took his blood pressure medication today. Pt endorses this headache is consistent with his prior headache patterns. Pt denies syncope or fevers. Denies nausea, emesis, chest pain, shortness of breath, abdominal pain, dysuria, hematuria, diarrhea, constipation, or any other acute complaints. NKDA.  Manuelito #[id number]. 56 y/o M pt with a PMHx of HTN and GERD presents to ED c/o frontal L sided headache since yesterday. Pt took his blood pressure yesterday and measured it to be 175 systolic. Pt is suppose to take blood pressure medications but hasn't taken them because he does not feel his blood pressure is high. Pt awoke this morning and still had a headache so he took his blood pressure medication today. Pt endorses this headache is consistent with his prior headache patterns. Pt denies syncope or fevers. Denies nausea, emesis, chest pain, shortness of breath, abdominal pain, dysuria, hematuria, diarrhea, constipation, or any other acute complaints. NKDA.

## 2021-01-05 NOTE — ED PROVIDER NOTE - PATIENT PORTAL LINK FT
You can access the FollowMyHealth Patient Portal offered by [facility] by registering at the following website: [email or web address]. By joining [employer]’s FollowMyHealth portal, you will also be able to view your health information using other applications (apps) compatible with our system.

## 2021-01-05 NOTE — ED PROVIDER NOTE - PROGRESS NOTE DETAILS
patient feeling improved. instructed to continue taking blood pressure medications daily. : Danny [id number] used.

## 2022-09-27 NOTE — ED PROVIDER NOTE - PROGRESS NOTE
HR=78 bpm, GUTT=083/74 mmhg, SpO2=97.0 %, Resp=18 B/min, EtCO2=36 mmHg, Apnea=0 Seconds, Pain=0, Nettles=2, Comment=SR Improved.

## 2025-07-15 NOTE — ED ADULT NURSE NOTE - HIV OFFER
DEPARTMENT OF HOSPITAL MEDICINE    HISTORY AND PHYSICAL EXAM    PATIENT NAME:  Narayan Eddy    MRN:  [id number]  SERVICE DATE:  7/14/2025   SERVICE TIME:  11:38 PM    Primary Care Physician: Mukesh Prieto MD         SUBJECTIVE  CHIEF COMPLAINT:  Fall       HPI: Patient being admitted for hyponatremia.  Patient is alert and oriented x 3, 70-year-old  male.  Patient came into the ED today after a fall.  Patient states he has had increased difficulty walking 2/2 worsening polyneuropathy with spinal stenosis and at baseline uses a walker.  He reports at least 6 falls in the past month.  He states his feet have been more swollen lately and his legs just gave out due to the weakness.  Patient denies having any lightheadedness, dizziness, chest pain, shortness of breath, fever, abdominal pain, nausea, or vomiting.  Patient states that he probably needs rehabilitation.  Patient also admits to drinking alcohol daily throughout the day.  He typically drinks vodka and Gatorade.  Denies use of other alcohol including beer.  States he does not eat or drink much of anything else on a regular basis.  Unaware of any weight loss or weight gain.  Patient denies any new medications and only occasionally uses Tylenol or ibuprofen but not every day and nothing recently.  Patient takes medications daily for past history of HTN, DM 2, HLD, polyneuropathy with spinal stenosis, and asthma.    PAST MEDICAL HISTORY:    Past Medical History:   Diagnosis Date    Actinic keratoses     has had LN2 and used Efudex    Alcohol consumption of one to four drinks per day on alcohol screening     Alcoholism (HCC)     Allergic rhinitis     cats, weeds, grasses, ragweed    Asthma     Bilateral knee pain     Diabetes mellitus (HCC)     Gout     History of colon polyps 02/2016    needs f/u 5 years [facility]    History of type 2 diabetes mellitus     about 15 years    Hyperlipidemia     Hypertension     Keratosis, inflamed seborrheic     
Opt out